# Patient Record
Sex: FEMALE | Race: WHITE | NOT HISPANIC OR LATINO
[De-identification: names, ages, dates, MRNs, and addresses within clinical notes are randomized per-mention and may not be internally consistent; named-entity substitution may affect disease eponyms.]

---

## 2017-03-02 ENCOUNTER — RESULT REVIEW (OUTPATIENT)
Age: 27
End: 2017-03-02

## 2017-03-03 ENCOUNTER — APPOINTMENT (OUTPATIENT)
Dept: OBGYN | Facility: CLINIC | Age: 27
End: 2017-03-03

## 2017-03-09 ENCOUNTER — EMERGENCY (EMERGENCY)
Facility: HOSPITAL | Age: 27
LOS: 0 days | Discharge: HOME | End: 2017-03-09
Attending: EMERGENCY MEDICINE
Payer: COMMERCIAL

## 2017-03-09 VITALS
DIASTOLIC BLOOD PRESSURE: 90 MMHG | HEART RATE: 126 BPM | TEMPERATURE: 98 F | RESPIRATION RATE: 22 BRPM | OXYGEN SATURATION: 96 % | HEIGHT: 63 IN | SYSTOLIC BLOOD PRESSURE: 136 MMHG | WEIGHT: 149.91 LBS

## 2017-03-09 DIAGNOSIS — J45.901 UNSPECIFIED ASTHMA WITH (ACUTE) EXACERBATION: ICD-10-CM

## 2017-03-09 DIAGNOSIS — J45.30 MILD PERSISTENT ASTHMA, UNCOMPLICATED: ICD-10-CM

## 2017-03-09 PROCEDURE — 99284 EMERGENCY DEPT VISIT MOD MDM: CPT

## 2017-03-09 RX ORDER — ALBUTEROL 90 UG/1
2 AEROSOL, METERED ORAL
Qty: 0 | Refills: 0 | COMMUNITY

## 2017-03-09 RX ORDER — IPRATROPIUM/ALBUTEROL SULFATE 18-103MCG
3 AEROSOL WITH ADAPTER (GRAM) INHALATION ONCE
Qty: 0 | Refills: 0 | Status: COMPLETED | OUTPATIENT
Start: 2017-03-09 | End: 2017-03-09

## 2017-03-09 RX ORDER — IPRATROPIUM/ALBUTEROL SULFATE 18-103MCG
3 AEROSOL WITH ADAPTER (GRAM) INHALATION
Qty: 0 | Refills: 0 | Status: COMPLETED | OUTPATIENT
Start: 2017-03-09 | End: 2017-03-09

## 2017-03-09 RX ORDER — ALBUTEROL 90 UG/1
3 AEROSOL, METERED ORAL
Qty: 0 | Refills: 0 | COMMUNITY

## 2017-03-09 RX ORDER — ALBUTEROL 90 UG/1
3 AEROSOL, METERED ORAL
Qty: 2 | Refills: 0 | OUTPATIENT
Start: 2017-03-09 | End: 2017-03-24

## 2017-03-09 RX ADMIN — Medication 3 MILLILITER(S): at 21:30

## 2017-03-09 RX ADMIN — Medication 3 MILLILITER(S): at 22:32

## 2017-03-09 RX ADMIN — Medication 50 MILLIGRAM(S): at 23:02

## 2017-03-09 RX ADMIN — Medication 3 MILLILITER(S): at 22:10

## 2017-03-09 RX ADMIN — Medication 3 MILLILITER(S): at 21:50

## 2017-03-09 RX ADMIN — Medication 3 MILLILITER(S): at 22:00

## 2017-03-09 NOTE — ED ADULT NURSE NOTE - OBJECTIVE STATEMENT
Patient reports "asthma symptoms all day today." + cough nonproductive. Denies fever or chills. Respirations even non-labored. + I/E wheezing noted bilaterally. No relief with inhalers or nebulizers at home. Never been intubated

## 2017-03-09 NOTE — ED ADULT NURSE REASSESSMENT NOTE - NS ED NURSE REASSESS COMMENT FT1
Patient received 1 Duoneb during triage, continues to wheeze. Second Duoneb in progress.
Third Duoneb initiated.

## 2017-03-09 NOTE — ED PROVIDER NOTE - OBJECTIVE STATEMENT
Pt is a 25 yo lady with a pmhx of asthma, never intubated who presents to the ED with asthma exacerbation. This started today, with some wheezing. Speaking in full sentences, tried some rescue inhaler and nebs at home but didn't resolve. No fevers, no chest pain, no n/v/d, no use of accessory muscles. Is feeling better now in ED.

## 2017-03-09 NOTE — ED ADULT TRIAGE NOTE - CHIEF COMPLAINT QUOTE
c/o asthma exacerbation since morning with sob/difficutly breathing and wheezing used inhaler prn and neb x 2 with slight relief

## 2017-03-10 PROBLEM — Z00.00 ENCOUNTER FOR PREVENTIVE HEALTH EXAMINATION: Noted: 2017-03-10

## 2017-03-17 ENCOUNTER — APPOINTMENT (OUTPATIENT)
Dept: PULMONOLOGY | Facility: CLINIC | Age: 27
End: 2017-03-17

## 2017-08-31 NOTE — ED ADULT NURSE NOTE - RN DISCHARGE SIGNATURE
Call your healthcare provider right away if you get any of the following signs or symptoms of bleeding problems:   * Pain, color, or temperature changes to any part of your body   * Headaches, dizziness or weakness   * Unusual bruising (unexplained or growing in size)   * Nosebleeds   * Bleeding gums   * Bleeding from cuts that take a long time to stop   * Menstrual bleeding or vaginal bleeding that is heavier than normal   * Pink or brown urine   * Red or black stools   * Coughing up blood   * Vomiting blood or material that looks like coffee grounds    Update Anticoagulation Clinic (Coumadin Clinic) with any of the following:   * Medication changes (new, stopped or dose changes, this includes over-the-counter medications and supplements)   * Planning on having any surgery, medical or dental procedures   * Diet changes   * Falls  (you should go to Emergency Department immediately for evaluation, then notify Anticoagulation Clinic)    Please, do not wait until your next appointment to update us on changes.         09-Mar-2017

## 2019-04-28 ENCOUNTER — TRANSCRIPTION ENCOUNTER (OUTPATIENT)
Age: 29
End: 2019-04-28

## 2019-05-18 ENCOUNTER — RESULT REVIEW (OUTPATIENT)
Age: 29
End: 2019-05-18

## 2019-06-13 PROBLEM — J45.909 UNSPECIFIED ASTHMA, UNCOMPLICATED: Chronic | Status: ACTIVE | Noted: 2017-03-09

## 2019-07-22 ENCOUNTER — TRANSCRIPTION ENCOUNTER (OUTPATIENT)
Age: 29
End: 2019-07-22

## 2020-10-11 NOTE — ED ADULT NURSE NOTE - TEMPLATE LIST FOR HEAD TO TOE ASSESSMENT
[facility]: About COVID-19: [email or web address]    CDC: What to Do If You're Sick: www.cdc.gov/coronavirus/2019-ncov/about/steps-when-sick.html    CDC: Ending Home Isolation: www.cdc.gov/coronavirus/2019-ncov/hcp/disposition-in-home-patients.html     CDC: Caring for Someone: www.cdc.gov/coronavirus/2019-ncov/if-you-are-sick/care-for-someone.html     [facility]: Interim Guidance for Hospital Discharge to Home: www.[email or web address].[facility].[email or web address]/diseases/coronavirus/hcp/hospdischarge.pdf    [facility] clinical trials (COVID-19 research studies): [email or web address]-trials     Below are the COVID-19 hotlines at the [place] [facility] (Providence 
Respiratory

## 2022-12-31 ENCOUNTER — NON-APPOINTMENT (OUTPATIENT)
Age: 32
End: 2022-12-31

## 2023-07-22 NOTE — ED ADULT TRIAGE NOTE - NS ED TRIAGE AVPU SCALE
-- Message is from [facility] [employer] ([facility])--    Called and left voicemail to inform the patient the physical listed for their [facility] appointment is out of scope.    ACC AGENT: If the patient calls back, please assist in scheduling at a different location.       *schedule at full service site for physical     
Alert-The patient is alert, awake and responds to voice. The patient is oriented to time, place, and person. The triage nurse is able to obtain subjective information.

## 2025-05-07 NOTE — ED ADULT TRIAGE NOTE - TEMPERATURE IN FAHRENHEIT (DEGREES F)
Medicare Annual Wellness Visit    Nicky Ellis is here for Medicare AWV    Assessment & Plan   Medicare annual wellness visit, subsequent     Return in 1 year (on 5/7/2026) for Medicare AWV.     Subjective     Patient's complete Health Risk Assessment and screening values have been reviewed and are found in Flowsheets. The following problems were reviewed today and where indicated follow up appointments were made and/or referrals ordered.    No Positive Risk Factors identified today.                                Objective    Patient-Reported Vitals  Patient-Reported Weight: 159 lbs  Patient-Reported Height: 5'4\"     Patient does not monitor BP or hr at home regularly.  Only if she feels woozy.  She did report her height and weight to me today.            Allergies   Allergen Reactions    Sulfa Antibiotics Hives     Prior to Visit Medications    Medication Sig Taking? Authorizing Provider   lisinopril (PRINIVIL;ZESTRIL) 10 MG tablet TAKE ONE TABLET BY MOUTH DAILY FOR BLOOD PRESSURE Yes Maurilio Collier MD   pravastatin (PRAVACHOL) 40 MG tablet TAKE ONE TABLET BY MOUTH EVERY EVENING FOR CHOLESTEROL Yes Maurilio Collier MD   ondansetron (ZOFRAN-ODT) 4 MG disintegrating tablet Take 1 tablet by mouth 3 times daily as needed for Nausea or Vomiting Yes Yuridia Subramanian APRN - CNP   fluticasone (FLONASE) 50 MCG/ACT nasal spray 2 sprays by Each Nostril route daily Yes Yuridia Subramanian APRN - CNP   alendronate (FOSAMAX) 70 MG tablet TAKE 1 TABLET BY MOUTH ONCE WEEKLY ON AN EMPTY STOMACH BEFORE BREAKFAST. REMAIN UPRIGHT FOR 30 MINUTES AND TAKE WITH 8 OUNCES OF WATER Yes Maurilio Collier MD   Calcium Acetate, Phos Binder, (CALCIUM ACETATE PO) Take 250 mg by mouth 2 times daily chewables Yes Alexandria Carter MD   meloxicam (MOBIC) 7.5 MG tablet TAKE 1-2 TABLETS BY MOUTH DAILY AS NEEDED FOR PAIN Yes Maurilio Collier MD   Cholecalciferol (VITAMIN D) 50 MCG (2000 UT) CAPS capsule Take by mouth daily Yes Alexandria Carter MD 
98.1